# Patient Record
Sex: MALE | Race: WHITE
[De-identification: names, ages, dates, MRNs, and addresses within clinical notes are randomized per-mention and may not be internally consistent; named-entity substitution may affect disease eponyms.]

---

## 2017-08-13 ENCOUNTER — HOSPITAL ENCOUNTER (EMERGENCY)
Dept: HOSPITAL 74 - JER | Age: 52
Discharge: HOME | End: 2017-08-13
Payer: COMMERCIAL

## 2017-08-13 VITALS — BODY MASS INDEX: 30.7 KG/M2

## 2017-08-13 VITALS — TEMPERATURE: 98.2 F

## 2017-08-13 VITALS — HEART RATE: 75 BPM | DIASTOLIC BLOOD PRESSURE: 75 MMHG | SYSTOLIC BLOOD PRESSURE: 135 MMHG

## 2017-08-13 DIAGNOSIS — E11.9: ICD-10-CM

## 2017-08-13 DIAGNOSIS — R51: Primary | ICD-10-CM

## 2017-08-13 DIAGNOSIS — E78.5: ICD-10-CM

## 2017-08-13 DIAGNOSIS — I10: ICD-10-CM

## 2017-08-13 LAB
ALBUMIN SERPL-MCNC: 4.4 G/DL (ref 3.4–5)
ALP SERPL-CCNC: 88 U/L (ref 45–117)
ALT SERPL-CCNC: 106 U/L (ref 12–78)
ANION GAP SERPL CALC-SCNC: 10 MMOL/L (ref 8–16)
AST SERPL-CCNC: 44 U/L (ref 15–37)
BASOPHILS # BLD: 0.5 % (ref 0–2)
BILIRUB SERPL-MCNC: 0.4 MG/DL (ref 0.2–1)
CALCIUM SERPL-MCNC: 9.5 MG/DL (ref 8.5–10.1)
CK SERPL-CCNC: 172 IU/L (ref 39–308)
CO2 SERPL-SCNC: 26 MMOL/L (ref 21–32)
CREAT SERPL-MCNC: 1 MG/DL (ref 0.7–1.3)
DEPRECATED RDW RBC AUTO: 12.6 % (ref 11.9–15.9)
EOSINOPHIL # BLD: 2.2 % (ref 0–4.5)
GLUCOSE SERPL-MCNC: 172 MG/DL (ref 74–106)
MCH RBC QN AUTO: 28.7 PG (ref 25.7–33.7)
MCHC RBC AUTO-ENTMCNC: 33.4 G/DL (ref 32–35.9)
MCV RBC: 85.9 FL (ref 80–96)
NEUTROPHILS # BLD: 71.8 % (ref 42.8–82.8)
PLATELET # BLD AUTO: 176 K/MM3 (ref 134–434)
PMV BLD: 7.8 FL (ref 7.5–11.1)
PROT SERPL-MCNC: 8.3 G/DL (ref 6.4–8.2)
TROPONIN I SERPL-MCNC: < 0.02 NG/ML (ref 0–0.05)
WBC # BLD AUTO: 8.6 K/MM3 (ref 4–10)

## 2017-08-13 PROCEDURE — 3E0337Z INTRODUCTION OF ELECTROLYTIC AND WATER BALANCE SUBSTANCE INTO PERIPHERAL VEIN, PERCUTANEOUS APPROACH: ICD-10-PCS | Performed by: EMERGENCY MEDICINE

## 2017-08-13 PROCEDURE — 3E033GC INTRODUCTION OF OTHER THERAPEUTIC SUBSTANCE INTO PERIPHERAL VEIN, PERCUTANEOUS APPROACH: ICD-10-PCS | Performed by: EMERGENCY MEDICINE

## 2017-08-13 NOTE — EKG
Test Reason : 

Blood Pressure : ***/*** mmHG

Vent. Rate : 074 BPM     Atrial Rate : 074 BPM

   P-R Int : 156 ms          QRS Dur : 084 ms

    QT Int : 382 ms       P-R-T Axes : 043 043 -17 degrees

   QTc Int : 424 ms

 

NORMAL SINUS RHYTHM

NONSPECIFIC T WAVE ABNORMALITY

ABNORMAL ECG

NO PREVIOUS ECGS AVAILABLE

Confirmed by POLLY LR, JUAN ([id number]) on 8/13/2017 1:09:28 PM

 

Referred By:             Confirmed By:JUAN MATIAS MD

## 2017-08-13 NOTE — PDOC
History of Present Illness





- General


Chief Complaint: Blood Pressure Problem


Stated Complaint: ELEVATED BP


Time Seen by Provider: 08/13/17 10:25


History Source: Patient





- History of Present Illness


Timing/Duration: other (last night)


Associated Symptoms: reports: headaches, malaise.  denies: chest pain, cough, 

diaphoresis, fever/chills, nausea/vomiting, shortness of breath, weakness





Past History





- Past Medical History


Allergies/Adverse Reactions: 


 Allergies











Allergy/AdvReac Type Severity Reaction Status Date / Time


 


No Known Allergies Allergy   Verified 08/13/17 10:15











Home Medications: 


Ambulatory Orders





Lisinopril [Zestril] 20 mg PO DAILY 05/27/12 


Metformin HCl [Glucophage Xr] 500 mg PO TID 05/27/12 


Lisinopril [Prinivil -] 40 mg PO DAILY #30 tablet 08/13/17 








Diabetes: Yes


HTN: Yes


Hypercholesterolemia: Yes





- Psycho/Social/Smoking Cessation Hx


Anxiety: No


Suicidal Ideation: No


Smoking Status: Yes


Smoking History: Never smoked


Number of Cigarettes Smoked Daily: 2


Information on smoking cessation initiated: No


Hx Alcohol Use: Yes (daily)


Substance Use Type: Alcohol





**Review of Systems





- Review of Systems


Constitutional: Yes: Malaise.  No: Chills, Fever


HEENTM: No: Blurred Vision


Respiratory: No: Cough, Shortness of Breath


Cardiac (ROS): No: Chest Pain


ABD/GI: No: Nausea, Vomiting


Neurological: Yes: Headache, Dizziness





*Physical Exam





- Vital Signs


 Last Vital Signs











Temp Pulse Resp BP Pulse Ox


 


 98.2 F   99 H  19   159/97   97 


 


 08/13/17 10:12  08/13/17 10:12  08/13/17 10:12  08/13/17 10:12  08/13/17 10:12














- Physical Exam


General Appearance: Yes: Appropriately Dressed.  No: Apparent Distress


HEENT: positive: Normal Voice.  negative: Scleral Icterus (R), Scleral Icterus (

L)


Neck: positive: Supple


Respiratory/Chest: positive: Lungs Clear, Normal Breath Sounds.  negative: 

Respiratory Distress


Cardiovascular: positive: Regular Rate, S1, S2


Gastrointestinal/Abdominal: positive: Soft.  negative: Tender


Extremity: positive: Normal Inspection


Integumentary: positive: Dry, Warm


Neurologic: positive: Fully Oriented, Alert, Normal Mood/Affect





**Heart Score/ECG Review





- ECG Intrepretation


Comment:: 





08/13/17 12:44


TWI in lead 111 only, ekg otherwise unremarkable





ED Treatment Course





- LABORATORY


CBC & Chemistry Diagram: 


 08/13/17 10:45





 08/13/17 10:45





- RADIOLOGY


Radiology Studies Ordered: 














 Category Date Time Status


 


 HEAD CT WITHOUT CONTRAST [CT] Stat CT Scan  08/13/17 10:38 Ordered














Medical Decision Making





- Medical Decision Making


08/13/17 10:52


52-year-old male, history of non-insulin-dependent diabetes, hyperlipidemia, 

hypertension, non-compliant with lisinopril, here with malaise and occipital  

headache in the setting of elevated blood pressure.  Patient states last night 

he started feeling unwell and anxious and had a constant occipital headache 

with possible dizziness.  Symptoms have since improved.  No nausea, vomiting, 

visual changes, slurred speech or focal weakness. Denies CP or SOB.  States 

blood pressure last night was 200s over 100s.  Patient reports that his blood 

pressure has been increasing and does admit that he does not always take his 

lisinopril.  States last time he saw his primary doctor was over a year ago, 

but is planning on changing doctors as he has a new insurance that his old 

doctor no longer accepts.  Patient also reports a history of alcohol abuse, 

especially over the past 8 months in the face of life stressors.  Admits to 

drinking half a pint of rum a day, but states he stopped drinking 2 days ago 

and not sure if this is the source of his symptoms








See exam








HA w/ malaise w/ worsening HTN in the setting of non-compliance and recent 

cessation of ETOH after prolonged use


Stable in ED w/ no focal deficits and does not appear to be in withdrawal at 

this time


-will check basic labs and CTH


-anticipate discharge if w/u neg w/ instructions to take BP meds as directed 

and will will refer to new PCP











08/13/17 10:59








08/13/17 12:13


Labs and CT head unremarkable.  EKG w/ twi in lead 111 only, no old to compare. 

In the setting of no chest pain/sob and neg trop, will not explore further at 

this time. After patient was informed of results, patient became extremely 

emotional and began crying. Reports that his 20-year-old son was recently 

incarcerated.  Patient does report feeling better after IV fluid and Reglan and 

requesting discharge at this time.  Will discharge w/ refill of lisinopril (had 

lengthy discussion with patient about importance of compliance).  Will refer to 

new PCP








*DC/Admit/Observation/Transfer


Diagnosis at time of Disposition: 


Headache


Qualifiers:


 Headache type: unspecified Headache chronicity pattern: acute headache 

Intractability: not intractable Qualified Code(s): R51 - Headache





- Discharge Dispostion


Disposition: HOME


Condition at time of disposition: Improved





- Prescriptions


Prescriptions: 


Lisinopril [Prinivil -] 40 mg PO DAILY #30 tablet





- Referrals


Referrals: 


Arsh Biswas MD [Staff Physician] - 





- Patient Instructions


Printed Discharge Instructions:  DI for High Blood Pressure, Alcohol Use 

Disorder


Additional Instructions: 


Take medication as prescribed and follow-up with your PMD





- Post Discharge Activity


Work/School Note:  Back to Work

## 2018-01-24 ENCOUNTER — HOSPITAL ENCOUNTER (EMERGENCY)
Dept: HOSPITAL 74 - JERFT | Age: 53
Discharge: HOME | End: 2018-01-24
Payer: COMMERCIAL

## 2018-01-24 VITALS — DIASTOLIC BLOOD PRESSURE: 86 MMHG | SYSTOLIC BLOOD PRESSURE: 147 MMHG | TEMPERATURE: 99.4 F

## 2018-01-24 VITALS — HEART RATE: 89 BPM

## 2018-01-24 VITALS — BODY MASS INDEX: 32.3 KG/M2

## 2018-01-24 DIAGNOSIS — E78.00: ICD-10-CM

## 2018-01-24 DIAGNOSIS — Z79.84: ICD-10-CM

## 2018-01-24 DIAGNOSIS — E11.9: ICD-10-CM

## 2018-01-24 DIAGNOSIS — I10: ICD-10-CM

## 2018-01-24 DIAGNOSIS — K52.9: Primary | ICD-10-CM

## 2018-01-24 LAB
APPEARANCE UR: CLEAR
BILIRUB UR STRIP.AUTO-MCNC: (no result) MG/DL
COLOR UR: YELLOW
EPITH CASTS URNS QL MICRO: (no result) /HPF
KETONES UR QL STRIP: NEGATIVE
LEUKOCYTE ESTERASE UR QL STRIP.AUTO: NEGATIVE
NITRITE UR QL STRIP: NEGATIVE
PH UR: 5.5 [PH] (ref 5–8)
PROT UR QL STRIP: (no result)
PROT UR QL STRIP: NEGATIVE
RBC # UR STRIP: NEGATIVE /UL
SP GR UR: >= 1.03 (ref 1–1.03)
UROBILINOGEN UR STRIP-MCNC: 0.2 MG/DL (ref 0.2–1)